# Patient Record
Sex: FEMALE
[De-identification: names, ages, dates, MRNs, and addresses within clinical notes are randomized per-mention and may not be internally consistent; named-entity substitution may affect disease eponyms.]

---

## 2019-12-31 PROBLEM — Z00.00 ENCOUNTER FOR PREVENTIVE HEALTH EXAMINATION: Status: ACTIVE | Noted: 2019-12-31

## 2020-01-06 ENCOUNTER — APPOINTMENT (OUTPATIENT)
Dept: SURGERY | Facility: CLINIC | Age: 60
End: 2020-01-06

## 2020-01-16 ENCOUNTER — APPOINTMENT (OUTPATIENT)
Dept: SURGERY | Facility: CLINIC | Age: 60
End: 2020-01-16
Payer: COMMERCIAL

## 2020-01-16 VITALS
BODY MASS INDEX: 23.9 KG/M2 | OXYGEN SATURATION: 98 % | SYSTOLIC BLOOD PRESSURE: 132 MMHG | HEART RATE: 68 BPM | WEIGHT: 140 LBS | DIASTOLIC BLOOD PRESSURE: 84 MMHG | HEIGHT: 64 IN | TEMPERATURE: 98.6 F

## 2020-01-16 DIAGNOSIS — Z82.49 FAMILY HISTORY OF ISCHEMIC HEART DISEASE AND OTHER DISEASES OF THE CIRCULATORY SYSTEM: ICD-10-CM

## 2020-01-16 DIAGNOSIS — R92.8 OTHER ABNORMAL AND INCONCLUSIVE FINDINGS ON DIAGNOSTIC IMAGING OF BREAST: ICD-10-CM

## 2020-01-16 DIAGNOSIS — Z78.9 OTHER SPECIFIED HEALTH STATUS: ICD-10-CM

## 2020-01-16 PROCEDURE — 99203 OFFICE O/P NEW LOW 30 MIN: CPT

## 2020-01-16 NOTE — CONSULT LETTER
[Dear  ___] : Dear  [unfilled], [Consult Letter:] : I had the pleasure of evaluating your patient, [unfilled]. [Please see my note below.] : Please see my note below. [Consult Closing:] : Thank you very much for allowing me to participate in the care of this patient.  If you have any questions, please do not hesitate to contact me. [Sincerely,] : Sincerely, [FreeTextEntry3] : Ulises Guy MD, FACS

## 2020-01-16 NOTE — PLAN
[FreeTextEntry1] : Ms. AQUINO  is presenting  today for an evaluation .  she is doing well and offers no complaints.  Results of  her recent  imaging and physical examination findings were discussed in details.   She  was advised to have            Left                breast US and Mammogram in  June 2020 and return after the tests. Importance of monthly self-breast examination was reinforced.  Patient's questions and concerns addressed to patient's satisfaction.\par \par

## 2020-01-16 NOTE — HISTORY OF PRESENT ILLNESS
[de-identified] : Patient is a 60 year  year-old female  who was referred by Dr. Cornelia Arias with the chief complaint of having a Left  breast mass for about 2 months. She  denies any trauma and She has no nipple discharge. She  denies any fever, night sweats or loss of appetite.    There is  family history of breast carcinoma sister at age 47. .   Menarche at age 16 -1 para-1 and her last menstrual period was in .  Patient is on no hormonal replacement therapy.   Patient  had a mammogram and BL breast US  on 12/15/2019 that was deemed a BIRADS 3 .  \par

## 2020-01-16 NOTE — PHYSICAL EXAM
[Alert] : alert [Oriented to Person] : oriented to person [Oriented to Place] : oriented to place [Oriented to Time] : oriented to time [Calm] : calm [de-identified] : She  is alert, well-groomed, and cheerful.\par   [de-identified] :   anicteric.  Nasal mucosa pink, septum midline. Oral mucosa pink.  Tongue midline, Pharynx without exudates.\par   [de-identified] :  Neck supple. Trachea midline. Thyroid isthmus barely palpable, lobes not felt.\par   [de-identified] : right breast periareolar scar. No chest deformity. Breast are symmetric, Normal contours. No nodules, masses, tenderness, or axillary or supraclavicular  adenopathy. No nipple discharge. no skin retraction \par

## 2020-01-16 NOTE — DATA REVIEWED
[FreeTextEntry1] : \par Exam requested by:\par EDSON LEGER PA\par [id number]\par [facility] [id number]\par \par \par  \par   \par SITE PERFORMED: [place]\par \par  \par   \par    \par    \par SITE PHONE: [phone]\par \par  \par  \par    \par    \par Patient: MARGARET AQUINO\par YOB: 1960\par Phone: [phone]\par MRN: [id number] Acc: [id number]\par Date of Exam: 12-\par  \par \par  \par  \par   \par   \par   \par \par \par \par  \par EXAM:  DIGITAL BILATERAL SCREENING MAMMOGRAM WITH CAD AND TOMOSYNTHESIS AND BREAST ULTRASOUND\par \par HISTORY:  The patient is 59 years old and is seen for screening mammogram. There is no personal history of breast cancer. Family history of breast cancer: Sister in her 40s. Patient has a history of previous intervention on the right for benign disease.\par \par CLINICAL BREAST EXAMINATION:  The patient's most recent clinical breast examination was in May 2019.\par \par COMPARISON:  The current study is compared with previous mammography dated 4/13/2018 and 3/23/2018. It is noted breast ultrasound dated 4/13/2018 demonstrated benign changes.\par \par MAMMOGRAM:\par TECHNIQUE:  The following views were obtained digitally: bilateral craniocaudal, bilateral mediolateral oblique. Computer-assisted detection (CAD) was utilized. Low-dose full-field digital breast tomosynthesis examination was performed with 3D acquisitions and C-View synthesized 2D reconstructed images. Patient's scar on the right is denoted with a skin marker.\par \par FINDINGS: \par BREAST COMPOSITION:  The breasts are heterogeneously dense, which may obscure small masses.\par Nodularity is again noted throughout this heterogeneous and extremely dense parenchyma.\par \par Stable nodular densities are again noted in the right lower outer quadrant, middle third; anterior right lower inner quadrant and left lower inner quadrant.\par \par There are no suspicious masses or significant calcifications identified.   There is no evidence of skin thickening or nipple retraction.\par \par There is no mammographic evidence of malignancy.\par \par Mammogram Assessment:  BI-RADS Category 2: Benign.\par \par ULTRASOUND:\par TECHNIQUE:  A bilateral breast ultrasound was performed with complete evaluation of the four quadrants/retroareolar region and axilla. \par \par FINDINGS: At 6 o'clock on the left, 3 cm from the nipple, a complex septated cystic and solid focus is seen measuring 0.9 x 0.3 x 0.9 cm. Appearance is most compatible with a cyst cluster. It is not significantly changed when compared with prior study dated 4/13/2018. An adjacent hypoechoic lesion is seen at 6 o'clock, 4 cm from the nipple measuring 0.6 x 0.3 x 0.8 cm. This has not been visualized previously. These lesions may correspond to the stable nodular densities noted in this area on mammography. Short-term 6 month targeted left breast ultrasound follow-up examination is recommended to confirm stability.\par \par A cyst is seen at 6 o'clock on the right, 3 cm from the nipple measuring 0.4 x 0.2 x 0.3 cm and a bilobed cystic lesion with internal debris is seen at 8 o'clock on the right, 4 cm from the nipple measuring 0.7 x 0.2 x 0.3 cm. The cystic lesion at 8 o'clock corresponds with the stable nodular density noted in that area on mammography.\par \par There is no suspicious solid nodule or significant adenopathy demonstrated in either breast or axilla.   No areas of suspicious shadowing are identified.\par \par There is no ultrasound evidence of malignancy.\par \par IMPRESSION: Stable mammogram.\par \par Heterogeneous and extremely dense and nodular parenchyma as discussed above on mammography.\par \par Probable cyst cluster and hypoechoic lesion 6 o'clock left breast as described above on ultrasound. These may correspond to the stable nodularity noted in this area on mammography. Short-term 6 month targeted left breast ultrasound follow-up examination is recommended to confirm stability.\par \par Cystic lesions 6 o'clock and 8 o'clock right breast as described above on ultrasound. The bilobed cystic lesion at 8 o'clock corresponds with the stable nodular density noted in that area on mammography.\par \par No mammographic or ultrasound evidence of malignancy. \par \par FOLLOW-UP:  Follow-up imaging in 6 months.\par Short-term 6 month targeted left breast ultrasound follow-up examination in June 2020.\par \par ASSESSMENT:  BI-RADS Category 3:  Probably benign.\par \par The false-negative rate of mammography is approximately 10%. \par \par As per the FDA requirements, a layman's letter has been generated and sent to your patient stating the results and recommendations of this breast imaging study. We have entered your patient into our reminder system and will notify them when they are due for their next breast imaging exam. \par \par \par \par \par  \par \par \par  \par Thank you for the opportunity to participate in the care of this patient.  \par  \par Jaida Alberts MD  - Electronically Signed: 12- 10:59 AM \par Physician to Physician Direct Line is: [phone]\par \par \par  \par  \par   \par   \par   \par  \par \par \par    \par  \par